# Patient Record
Sex: MALE | Race: BLACK OR AFRICAN AMERICAN | Employment: FULL TIME | URBAN - METROPOLITAN AREA
[De-identification: names, ages, dates, MRNs, and addresses within clinical notes are randomized per-mention and may not be internally consistent; named-entity substitution may affect disease eponyms.]

---

## 2018-12-13 ENCOUNTER — OFFICE VISIT (OUTPATIENT)
Dept: UROLOGY | Age: 63
End: 2018-12-13

## 2018-12-13 VITALS
BODY MASS INDEX: 31.08 KG/M2 | HEART RATE: 61 BPM | DIASTOLIC BLOOD PRESSURE: 80 MMHG | OXYGEN SATURATION: 96 % | HEIGHT: 71 IN | WEIGHT: 222 LBS | SYSTOLIC BLOOD PRESSURE: 110 MMHG

## 2018-12-13 DIAGNOSIS — R97.20 ELEVATED PSA: Primary | ICD-10-CM

## 2018-12-13 LAB
BILIRUB UR QL STRIP: NEGATIVE
GLUCOSE UR-MCNC: NEGATIVE MG/DL
KETONES P FAST UR STRIP-MCNC: NEGATIVE MG/DL
PH UR STRIP: 5.5 [PH] (ref 4.6–8)
PROT UR QL STRIP: NEGATIVE
SP GR UR STRIP: 1.02 (ref 1–1.03)
UA UROBILINOGEN AMB POC: NORMAL (ref 0.2–1)
URINALYSIS CLARITY POC: CLEAR
URINALYSIS COLOR POC: YELLOW
URINE BLOOD POC: NEGATIVE
URINE LEUKOCYTES POC: NEGATIVE
URINE NITRITES POC: NEGATIVE

## 2018-12-13 RX ORDER — CIPROFLOXACIN 500 MG/1
500 TABLET ORAL 2 TIMES DAILY
Qty: 20 TAB | Refills: 0 | Status: SHIPPED | OUTPATIENT
Start: 2018-12-13 | End: 2018-12-23

## 2018-12-13 RX ORDER — METOPROLOL SUCCINATE 50 MG/1
TABLET, EXTENDED RELEASE ORAL DAILY
COMMUNITY

## 2018-12-13 RX ORDER — LISINOPRIL 40 MG/1
40 TABLET ORAL DAILY
COMMUNITY

## 2018-12-13 RX ORDER — ATORVASTATIN CALCIUM 20 MG/1
TABLET, FILM COATED ORAL DAILY
COMMUNITY

## 2018-12-13 RX ORDER — FUROSEMIDE 20 MG/1
TABLET ORAL DAILY
COMMUNITY

## 2018-12-13 RX ORDER — ASPIRIN 81 MG/1
TABLET ORAL DAILY
COMMUNITY

## 2018-12-13 RX ORDER — AMLODIPINE BESYLATE 10 MG/1
TABLET ORAL DAILY
COMMUNITY

## 2018-12-13 NOTE — PROGRESS NOTES
Mr. Lanny Blevins has a reminder for a \"due or due soon\" health maintenance. I have asked that he contact his primary care provider for follow-up on this health maintenance.

## 2018-12-13 NOTE — PATIENT INSTRUCTIONS
Prostate Biopsy and Ultrasound: About This Test  What is it? A prostate biopsy is a type of test. Your doctor takes small tissue samples from your prostate gland. Then another doctor looks at the tissue under a microscope to see if there are cancer cells. This test is done by a doctor who specializes in men's genital and urinary problems (urologist). It can be done in your doctor's office, a day surgery clinic, or a hospital operating room. To get the tissue samples from the prostate, the doctor inserts a thin needle through the rectum, the urethra, or the area between the anus and scrotum (perineum). The most common method is through the rectum. Your doctor may use ultrasound to help guide the needle. Why is this test done? You may need a prostate biopsy if your doctor found something of concern during a digital rectal exam. Or you may need it if a blood test showed a high level of prostate-specific antigen (PSA). A biopsy can help find out if you have prostate cancer. How can you prepare for this test?  Before you have a prostate biopsy, tell your doctor if:  · You are taking any medicines or using any herbal supplements. · You are allergic to any medicines. · You have had bleeding problems, or you take aspirin or some other blood thinner. What happens before the test?  · You may need to have an enema before the test.  · You will need to take off all or most of your clothes. You will be given a cloth or paper gown to use during the test.  · You may be given a sedative through a vein (IV) in your arm. The sedative will help you relax and stay still. What happens during the test?  Through the rectum  · You may be asked to kneel, lie on your side, or lie on your back. · Your doctor may inject an anesthetic around the prostate to numb the area before the sample is taken. · Ultrasound is often used to guide the needle to the correct spot. · Your doctor may choose to use a needle guide for the biopsy. He or she will attach the guide to a finger. Your doctor will insert the finger into your rectum. · The needle will enter the prostate and take 6 to 12 samples. Through the urethra  · You will lie on your back. Your feet will rest in stirrups. · You will get anesthesia. The anesthesia may make you sleep. Or it may just numb the area being worked on.  · Your doctor will insert a lighted scope (cystoscope) into your urethra. The scope allows your doctor to look directly at the prostate. Your doctor will pass a cutting loop through the scope to remove samples of prostate tissue. Through the perineum  · You will lie on an exam table either on your side or on your back with your knees bent. You will get anesthesia. The anesthesia may make you sleep. Or it may just numb the area being worked on.  · Your doctor will make a small cut in your perineum. Then he or she will insert a finger into the rectum to hold the prostate. · Your doctor will then insert the needle through the cut and into the prostate. · The needle collects samples of tissue and is then pulled out. What else should you know about this test?  · A prostate biopsy has a slight risk of causing problems such as infection or bleeding. · If the biopsy went through your rectum, you may have a small amount of bleeding from your rectum for 2 to 3 days after the biopsy. · You may have a little pain in your pelvic area. You may also have a little blood in your urine for 1 to 5 days. · You may have some blood in your semen for a week or longer. How long will the test take? · This test will take about 15 to 45 minutes. What happens after the test?  Your doctor will tell you what to expect and watch for after your test. In general:  · If you have anesthesia that makes you sleep, you will be in a recovery room for a few hours. You will need someone to drive you home. You may feel tired for the rest of the day.   · You will probably be able to go home right away.  · If your doctor had you stop taking any medicine for the biopsy, ask him or her when you can start taking it again. If you were given antibiotics, take them as directed. · Do not do heavy work or exercise for 4 hours after the test.  · Your doctor will tell you how long it may take to get your results back. Follow-up care is a key part of your treatment and safety. Be sure to make and go to all appointments, and call your doctor if you are having problems. It's also a good idea to keep a list of the medicines you take. Ask your doctor when you can expect to have your test results. Where can you learn more? Go to http://nataly-rafael.info/. Enter Y504 in the search box to learn more about \"Prostate Biopsy and Ultrasound: About This Test.\"  Current as of: March 28, 2018  Content Version: 11.8  © 6112-1135 [employer]. Care instructions adapted under license by [employer] (which disclaims liability or warranty for this information). If you have questions about a medical condition or this instruction, always ask your healthcare professional. [address] any warranty or liability for your use of this information.

## 2018-12-13 NOTE — PROGRESS NOTES
Abdullahi Botello 59 y.o. male     Mr. Paula Nazario seen today for evaluation of elevated PSA found on routine testing-PSA 8.6    Patient has no irritative or obstructive voiding symptoms  No family history of prostate malignancy      PSA 8.6 on 27 September 2018    Hospitalized at [facility] in [facility] for sick sinus syndrome-pacemaker implanted/on no anticoagulation therapy      Review of Systems:   CNS: No seizures syncope headaches or visual changes  Respiratory: No wheezing shortness of breath chest pain or coughing  Cardiovascular: Sick sinus syndrome/pacemaker  Intestinal: No dyspepsia diarrhea or constipation  Urinary: Urinary urgency and frequency  Skeletal: Large joint arthritis  Endocrine no diabetes or thyroid disease  Other:    Allergies: No Known Allergies   Medications:    Current Outpatient Medications   Medication Sig Dispense Refill    lisinopril (PRINIVIL, ZESTRIL) 40 mg tablet Take 40 mg by mouth daily.  metoprolol succinate (TOPROL-XL) 50 mg XL tablet Take  by mouth daily.  furosemide (LASIX) 20 mg tablet Take  by mouth daily.  amLODIPine (NORVASC) 10 mg tablet Take  by mouth daily.  aspirin delayed-release 81 mg tablet Take  by mouth daily.  atorvastatin (LIPITOR) 20 mg tablet Take  by mouth daily.  ciprofloxacin HCl (CIPRO) 500 mg tablet Take 1 Tab by mouth two (2) times a day for 10 days. 20 Tab 0       Past Medical History:   Diagnosis Date    Acute kidney injury ([address]     Back problem     Chest pain     Dizzinesses     Heart abnormality     Hypercholesterolemia     Hypertension     Joint swelling     Muscle ache     Muscle pain     Sleep trouble     Stiff joint     Swallowing difficulty       History reviewed. No pertinent surgical history.   Social History     Socioeconomic History    Marital status:      Spouse name: Not on file    Number of children: Not on file    Years of education: Not on file    Highest education level: Not on file   Social Needs    Financial resource strain: Not on file    Food insecurity - worry: Not on file    Food insecurity - inability: Not on file    Transportation needs - medical: Not on file   [employer] needs - non-medical: Not on file   Occupational History    Not on file   Tobacco Use    Smoking status: Current Every Day Smoker    Smokeless tobacco: Never Used   Substance and Sexual Activity    Alcohol use: Yes    Drug use: No    Sexual activity: Not Currently   Other Topics Concern    Not on file   Social History Narrative    Not on file      History reviewed. No pertinent family history.      Physical Examination: Well-nourished mature male in no apparent distress                           left subclavicular pacemaker implant    Abdomen is nontender no palpable masses no organomegaly  Back-no percussion CVA tenderness on either side  No inguinal hernia or adenopathy  Penis is normal  Testes are normal in size shape and consistency bilaterally-no epididymal induration or tenderness on either side  Spermatic cords are palpably normal bilaterally  Scrotum small fluctuant nontender right hydrocele  Prostate by KAREN is large, rounded, smooth, benign in consistency and nontender-no induration no nodularity  No rectal masses tenderness or induration      Urinalysis: Negative dipstick/nitrite negative/heme-negative    Impression: Elevated PSA normal prostate by KAREN      Plan: Prostate biopsy is indicated and recommended    Counseled today regarding technique of transperineal prostate biopsy including risk of bleeding, infection, urinary retention  Rx Cipro 500 mg twice daily times 3 days prep prophylaxis/attendant required    RTC 3 weeks for prostate biopsy-accompanied by an attendant who will serve as designated  after prostate biopsy      MD Laura Gonzales MD  -electronically signed-    PLEASE NOTE:  This document has been produced using voice recognition software. Unrecognized errors in transcription may be present.

## 2019-01-31 ENCOUNTER — OFFICE VISIT (OUTPATIENT)
Dept: UROLOGY | Age: 64
End: 2019-01-31

## 2019-01-31 VITALS
HEART RATE: 66 BPM | WEIGHT: 224 LBS | BODY MASS INDEX: 31.36 KG/M2 | SYSTOLIC BLOOD PRESSURE: 124 MMHG | OXYGEN SATURATION: 99 % | HEIGHT: 71 IN | DIASTOLIC BLOOD PRESSURE: 83 MMHG

## 2019-01-31 DIAGNOSIS — R97.20 ELEVATED PSA: Primary | ICD-10-CM

## 2019-01-31 DIAGNOSIS — R77.2 ELEVATED AFP: ICD-10-CM

## 2019-01-31 LAB
BILIRUB UR QL STRIP: NEGATIVE
GLUCOSE UR-MCNC: NEGATIVE MG/DL
KETONES P FAST UR STRIP-MCNC: NEGATIVE MG/DL
PH UR STRIP: 5 [PH] (ref 4.6–8)
PROT UR QL STRIP: NEGATIVE
SP GR UR STRIP: 1.01 (ref 1–1.03)
UA UROBILINOGEN AMB POC: NORMAL (ref 0.2–1)
URINALYSIS CLARITY POC: CLEAR
URINALYSIS COLOR POC: YELLOW
URINE BLOOD POC: NEGATIVE
URINE LEUKOCYTES POC: NEGATIVE
URINE NITRITES POC: NEGATIVE

## 2019-01-31 RX ORDER — SULFAMETHOXAZOLE AND TRIMETHOPRIM 800; 160 MG/1; MG/1
TABLET ORAL
COMMUNITY
Start: 2019-01-23 | End: 2019-02-12

## 2019-01-31 RX ORDER — CIPROFLOXACIN 500 MG/1
TABLET ORAL 2 TIMES DAILY
COMMUNITY
End: 2019-02-12

## 2019-01-31 NOTE — PROGRESS NOTES
Ceasar Shelby 59 y.o. male Mr. Richy Sanches seen today for prostate biopsy assessing elevated PSA Prostate biopsy attempt in the office unsuccessful because of intolerance for needle sticks in the perineum and prostate region Prostate biopsy will require general anesthesia 
elevated PSA found on routine testing-PSA 8.6 
  
Patient has no irritative or obstructive voiding symptoms No family history of prostate malignancy 
  
  
PSA 8.6 on 27 September 2018 
  
Hospitalized at [facility] in [facility] for sick sinus syndrome-pacemaker implanted/on no anticoagulation therapy 
  
  
Review of Systems:  
CNS: No seizures syncope headaches or visual changes Respiratory: No wheezing shortness of breath chest pain or coughing Cardiovascular: Sick sinus syndrome/pacemaker Intestinal: No dyspepsia diarrhea or constipation Urinary: Urinary urgency and frequency Skeletal: Large joint arthritis Endocrine no diabetes or thyroid disease Other: 
  
 
TRANSRECTAL ULTRASOUND IMAGING OF THE PROSTATE: 
 
Multiple transverse and longitudinal images of the p rostate, seminal vesicles, and bladder revealed: Symmetrical outlines bilaterally with central cystic density measuring 7 mm-no stone densities evident no hypodense segments evident on either lobe Prostate Dimensions: 3.8 cm x 3.0 cm x 4.2 cm Prostate Volume = 0.5(H)(L)(W) =   23.9       cc  
 
PSA Density: (PSA)/Prostate Volume =   8.6/23.9   =  PSAD  =    0.36 PROSTATE BIOPSY REPORT Patient ID confirmed prior to procedure: Yes Indications for prostate biopsy: PSA 8.6 Pre biopsy preparations:         ___x_____ Cipro 500 mg PO Biopsy Procedure Local anesthesia of the perineum was obtained by injecting 1% Lidocaine into the skin of the perineum 1 cm anterior to the anal verge and then into the deep perineum in the direction of the prostate.  Guided by a gloved finger in the rectum, a 14 gauge needle was then passed into the anesthetized region and advanced in the direction of the prostate. An 18 gauge spinal needle was then passed through the 14 gauge needle and 10 cc's of 1% Lidocaine was infiltrated periprostatically with needle placement guided by ultrasound imaging obtained by transrectal positioning of the ultrasound transducer. An 18 gauge Biopty needle was then passed through the 14 gauge conduit needle multiple times obtaining  0   cores of tissue from the left lobe and   0   cores of tissue from the right lobe, biopsy needle placement guided by real time transrectal ultrasound imaging of the needle along its path through the perineum into the prostate. The conduit needle was then removed and pressure was applied to the perineum for one minute. The patient was then given oral and written instructions regarding post biopsy precautions as well as instructions regarding activity, medication, and follow up. Comments: Transperineal prostate biopsy procedure was aborted because of the patient's intolerance of needle sticks and spasm of buttock muscles preventing access to the perineum for passage of biopsy needle EBL: Minimal none  
specimen removed: Allergies: No Known Allergies Medications:   
Current Outpatient Medications Medication Sig Dispense Refill  trimethoprim-sulfamethoxazole (BACTRIM DS, SEPTRA DS) 160-800 mg per tablet  ciprofloxacin HCl (CIPRO) 500 mg tablet Take  by mouth two (2) times a day.  pitavastatin calcium (LIVALO) 2 mg tablet Take  by mouth daily.  lisinopril (PRINIVIL, ZESTRIL) 40 mg tablet Take 40 mg by mouth daily.  metoprolol succinate (TOPROL-XL) 50 mg XL tablet Take  by mouth daily.  furosemide (LASIX) 20 mg tablet Take  by mouth daily.  amLODIPine (NORVASC) 10 mg tablet Take  by mouth daily.  aspirin delayed-release 81 mg tablet Take  by mouth daily.  atorvastatin (LIPITOR) 20 mg tablet Take  by mouth daily. Past Medical History: Diagnosis Date  Acute kidney injury ([facility] [address]  Back problem  Chest pain  Dizzinesses  Heart abnormality  Hypercholesterolemia  Hypertension  Joint swelling  Muscle ache  Muscle pain  Sleep trouble  Stiff joint  Swallowing difficulty History reviewed. No pertinent surgical history. Social History Socioeconomic History  Marital status:  Spouse name: Not on file  Number of children: Not on file  Years of education: Not on file  Highest education level: Not on file Social Needs  Financial resource strain: Not on file  Food insecurity - worry: Not on file  Food insecurity - inability: Not on file  Transportation needs - medical: Not on file  Transportation needs - non-medical: Not on file Occupational History  Not on file Tobacco Use  Smoking status: Current Every Day Smoker  Smokeless tobacco: Never Used Substance and Sexual Activity  Alcohol use: Yes  Drug use: No  
 Sexual activity: Not Currently Other Topics Concern  Not on file Social History Narrative  Not on file History reviewed. No pertinent family history. Physical Examination: Well-nourished mature male in no apparent distress Neck: No masses nodes or bruits Lungs: No wheezes rales or rhonchi Heart: Regular sinus rhythm no murmurs no gallops no rubs Abdomen: Nontender no palpable masses no organomegaly no bruits Back: No percussion CVA tenderness Skin: Warm and dry no rash no lesions Neurologic: No motor or sensory deficits in arms or legs Genitalia: Penis is normal, testes are normal bilaterally, prostate by KAREN is normal in size shape and consistency-nontender no nodularity No rectal masses induration or tenderness Urinalysis: Negative dipstick/nitrite negative/heme-negative Impression: Elevated PSA with failed attempt at prostate biopsy in office Plan: Transperineal prostate biopsy under general anesthesia at [facility] 
 
Counseling Note: 
Technique of transperineal prostate biopsy have been described discussed along with possible complications of bleeding, infection, and urinary retention RTC 1 week after biopsy for pathology report More than 1/2 of this 25 minute visit was spent in counselling and coordination of care, as described above. Sonia Krueger MD 
-electronically signed- 
 
PLEASE NOTE: 
This document has been produced using voice recognition software. Unrecognized errors in transcription may be present.

## 2019-01-31 NOTE — PROGRESS NOTES
Mr. Shon Latham has a reminder for a \"due or due soon\" health maintenance. I have asked that he contact his primary care provider for follow-up on this health maintenance. [facility] PROCEDURE PROGRESS NOTE Chart reviewed for the following: 
 Gaby Edgar LPN, have reviewed the History, Physical and updated the Allergic reactions for [facility] TIME OUT performed immediately prior to start of procedure: 
 I, Ziyad Diaz LPN, have performed the following reviews on [facility] prior to the start of the procedure: 
         
* Patient was identified by name and date of birth * Agreement on procedure being performed was verified * Risks and Benefits explained to the patient * Procedure site verified and marked as necessary * Patient was positioned for comfort * Consent was signed and verified Time: 13:50 Date of procedure: 1/31/2019 Procedure performed by:  Lian Chappell MD 
 
Provider assisted by: Madison Fonseca LPN Patient assisted by: self How tolerated by patient: tolerated the procedure well with no complications Post Procedural Pain Scale: 0 - No Hurt Comments: Patient verbalized understanding of procedure and post procedure instructions.

## 2019-02-06 RX ORDER — SODIUM CHLORIDE 9 MG/ML
100 INJECTION, SOLUTION INTRAVENOUS CONTINUOUS
Status: CANCELLED | OUTPATIENT
Start: 2019-02-06

## 2019-02-07 LAB
ANION GAP SERPL CALC-SCNC: 12.8 MMOL/L
BUN SERPL-MCNC: 21 MG/DL (ref 6–22)
CALCIUM SERPL-MCNC: 9.3 MG/DL (ref 8.4–10.4)
CHLORIDE SERPL-SCNC: 102 MMOL/L (ref 98–110)
CO2 SERPL-SCNC: 28 MMOL/L (ref 20–32)
CREAT SERPL-MCNC: 2.1 MG/DL (ref 0.8–1.6)
ERYTHROCYTE [DISTWIDTH] IN BLOOD BY AUTOMATED COUNT: 14.3 % (ref 10–15.5)
GFRAA, 66117: 38.8
GFRNA, 66118: 32
GLUCOSE SERPL-MCNC: 92 MG/DL (ref 70–99)
HCT VFR BLD AUTO: 38.6 % (ref 39.3–51.6)
HGB BLD-MCNC: 12.3 G/DL (ref 13.1–17.2)
MCH RBC QN AUTO: 26 PG (ref 26–34)
MCHC RBC AUTO-ENTMCNC: 32 G/DL (ref 31–36)
MCV RBC AUTO: 82 FL (ref 80–95)
PLATELET # BLD AUTO: 161 K/UL (ref 140–440)
PMV BLD AUTO: 12.1 FL (ref 9–13)
POTASSIUM SERPL-SCNC: 4 MMOL/L (ref 3.5–5.5)
RBC # BLD AUTO: 4.73 M/UL (ref 3.8–5.8)
SODIUM SERPL-SCNC: 143 MMOL/L (ref 133–145)
WBC # BLD AUTO: 6.3 K/UL (ref 4–11)

## 2019-02-14 ENCOUNTER — ANESTHESIA EVENT (OUTPATIENT)
Dept: SURGERY | Age: 64
End: 2019-02-14
Payer: COMMERCIAL

## 2019-02-15 ENCOUNTER — ANESTHESIA (OUTPATIENT)
Dept: SURGERY | Age: 64
End: 2019-02-15
Payer: COMMERCIAL

## 2019-02-15 ENCOUNTER — HOSPITAL ENCOUNTER (OUTPATIENT)
Age: 64
Setting detail: OUTPATIENT SURGERY
Discharge: HOME | End: 2019-02-15
Attending: UROLOGY | Admitting: UROLOGY
Payer: COMMERCIAL

## 2019-02-15 VITALS
BODY MASS INDEX: 31.81 KG/M2 | RESPIRATION RATE: 13 BRPM | HEIGHT: 71 IN | OXYGEN SATURATION: 94 % | DIASTOLIC BLOOD PRESSURE: 84 MMHG | TEMPERATURE: 97 F | SYSTOLIC BLOOD PRESSURE: 131 MMHG | WEIGHT: 227.25 LBS | HEART RATE: 60 BPM

## 2019-02-15 DIAGNOSIS — R97.20 ELEVATED PSA: Primary | ICD-10-CM

## 2019-02-15 PROCEDURE — 77030008683 HC TU ET CUF COVD -A: Performed by: NURSE ANESTHETIST, CERTIFIED REGISTERED

## 2019-02-15 PROCEDURE — 76210000016 HC OR PH I REC 1 TO 1.5 HR: Performed by: UROLOGY

## 2019-02-15 PROCEDURE — 76210000022 HC REC RM PH II 1.5 TO 2 HR: Performed by: UROLOGY

## 2019-02-15 PROCEDURE — 77030020782 HC GWN BAIR PAWS FLX 3M -B: Performed by: UROLOGY

## 2019-02-15 PROCEDURE — 74011000250 HC RX REV CODE- 250: Performed by: NURSE ANESTHETIST, CERTIFIED REGISTERED

## 2019-02-15 PROCEDURE — 77030034696 HC CATH URETH FOL 2W BARD -A: Performed by: UROLOGY

## 2019-02-15 PROCEDURE — 77030012863 HC BG URIN LEG HOLL -A: Performed by: UROLOGY

## 2019-02-15 PROCEDURE — 77030003481 HC NDL BIOP GUN BARD -B: Performed by: UROLOGY

## 2019-02-15 PROCEDURE — G0416 PROSTATE BIOPSY, ANY MTHD: HCPCS

## 2019-02-15 PROCEDURE — 77030018836 HC SOL IRR NACL ICUM -A: Performed by: UROLOGY

## 2019-02-15 PROCEDURE — 74011250636 HC RX REV CODE- 250/636

## 2019-02-15 PROCEDURE — 74011250636 HC RX REV CODE- 250/636: Performed by: NURSE ANESTHETIST, CERTIFIED REGISTERED

## 2019-02-15 PROCEDURE — 74011250636 HC RX REV CODE- 250/636: Performed by: UROLOGY

## 2019-02-15 PROCEDURE — 76060000032 HC ANESTHESIA 0.5 TO 1 HR: Performed by: UROLOGY

## 2019-02-15 PROCEDURE — 74011000250 HC RX REV CODE- 250

## 2019-02-15 PROCEDURE — 77030032490 HC SLV COMPR SCD KNE COVD -B: Performed by: UROLOGY

## 2019-02-15 PROCEDURE — 76010000160 HC OR TIME 0.5 TO 1 HR INTENSV-TIER 1: Performed by: UROLOGY

## 2019-02-15 PROCEDURE — 77030005206: Performed by: UROLOGY

## 2019-02-15 RX ORDER — DIPHENHYDRAMINE HYDROCHLORIDE 50 MG/ML
12.5 INJECTION, SOLUTION INTRAMUSCULAR; INTRAVENOUS
Status: DISCONTINUED | OUTPATIENT
Start: 2019-02-15 | End: 2019-02-15 | Stop reason: HOSPADM

## 2019-02-15 RX ORDER — SODIUM CHLORIDE, SODIUM LACTATE, POTASSIUM CHLORIDE, CALCIUM CHLORIDE 600; 310; 30; 20 MG/100ML; MG/100ML; MG/100ML; MG/100ML
50 INJECTION, SOLUTION INTRAVENOUS CONTINUOUS
Status: DISCONTINUED | OUTPATIENT
Start: 2019-02-15 | End: 2019-02-15 | Stop reason: HOSPADM

## 2019-02-15 RX ORDER — ROCURONIUM BROMIDE 10 MG/ML
INJECTION, SOLUTION INTRAVENOUS AS NEEDED
Status: DISCONTINUED | OUTPATIENT
Start: 2019-02-15 | End: 2019-02-15 | Stop reason: HOSPADM

## 2019-02-15 RX ORDER — SODIUM CHLORIDE, SODIUM LACTATE, POTASSIUM CHLORIDE, CALCIUM CHLORIDE 600; 310; 30; 20 MG/100ML; MG/100ML; MG/100ML; MG/100ML
75 INJECTION, SOLUTION INTRAVENOUS CONTINUOUS
Status: DISCONTINUED | OUTPATIENT
Start: 2019-02-15 | End: 2019-02-15 | Stop reason: HOSPADM

## 2019-02-15 RX ORDER — ALBUTEROL SULFATE 0.83 MG/ML
2.5 SOLUTION RESPIRATORY (INHALATION) AS NEEDED
Status: DISCONTINUED | OUTPATIENT
Start: 2019-02-15 | End: 2019-02-15 | Stop reason: HOSPADM

## 2019-02-15 RX ORDER — CEFAZOLIN SODIUM 2 G/50ML
2 SOLUTION INTRAVENOUS ONCE
Status: COMPLETED | OUTPATIENT
Start: 2019-02-15 | End: 2019-02-15

## 2019-02-15 RX ORDER — SUCCINYLCHOLINE CHLORIDE 20 MG/ML
INJECTION INTRAMUSCULAR; INTRAVENOUS AS NEEDED
Status: DISCONTINUED | OUTPATIENT
Start: 2019-02-15 | End: 2019-02-15 | Stop reason: HOSPADM

## 2019-02-15 RX ORDER — HYDROCODONE BITARTRATE AND ACETAMINOPHEN 5; 300 MG/1; MG/1
1 TABLET ORAL
Qty: 12 TAB | Refills: 0 | Status: SHIPPED | OUTPATIENT
Start: 2019-02-15 | End: 2019-02-17

## 2019-02-15 RX ORDER — DEXAMETHASONE SODIUM PHOSPHATE 4 MG/ML
INJECTION, SOLUTION INTRA-ARTICULAR; INTRALESIONAL; INTRAMUSCULAR; INTRAVENOUS; SOFT TISSUE AS NEEDED
Status: DISCONTINUED | OUTPATIENT
Start: 2019-02-15 | End: 2019-02-15 | Stop reason: HOSPADM

## 2019-02-15 RX ORDER — PROPOFOL 10 MG/ML
INJECTION, EMULSION INTRAVENOUS AS NEEDED
Status: DISCONTINUED | OUTPATIENT
Start: 2019-02-15 | End: 2019-02-15 | Stop reason: HOSPADM

## 2019-02-15 RX ORDER — HYDROMORPHONE HYDROCHLORIDE 2 MG/ML
0.5 INJECTION, SOLUTION INTRAMUSCULAR; INTRAVENOUS; SUBCUTANEOUS
Status: DISCONTINUED | OUTPATIENT
Start: 2019-02-15 | End: 2019-02-15 | Stop reason: HOSPADM

## 2019-02-15 RX ORDER — NALOXONE HYDROCHLORIDE 0.4 MG/ML
0.04 INJECTION, SOLUTION INTRAMUSCULAR; INTRAVENOUS; SUBCUTANEOUS AS NEEDED
Status: DISCONTINUED | OUTPATIENT
Start: 2019-02-15 | End: 2019-02-15 | Stop reason: HOSPADM

## 2019-02-15 RX ORDER — GLYCOPYRROLATE 0.2 MG/ML
INJECTION INTRAMUSCULAR; INTRAVENOUS AS NEEDED
Status: DISCONTINUED | OUTPATIENT
Start: 2019-02-15 | End: 2019-02-15 | Stop reason: HOSPADM

## 2019-02-15 RX ORDER — ONDANSETRON 2 MG/ML
INJECTION INTRAMUSCULAR; INTRAVENOUS AS NEEDED
Status: DISCONTINUED | OUTPATIENT
Start: 2019-02-15 | End: 2019-02-15 | Stop reason: HOSPADM

## 2019-02-15 RX ORDER — SODIUM CHLORIDE 0.9 % (FLUSH) 0.9 %
5-40 SYRINGE (ML) INJECTION AS NEEDED
Status: DISCONTINUED | OUTPATIENT
Start: 2019-02-15 | End: 2019-02-15 | Stop reason: HOSPADM

## 2019-02-15 RX ORDER — NEOSTIGMINE METHYLSULFATE 1 MG/ML
INJECTION INTRAVENOUS AS NEEDED
Status: DISCONTINUED | OUTPATIENT
Start: 2019-02-15 | End: 2019-02-15 | Stop reason: HOSPADM

## 2019-02-15 RX ORDER — SULFAMETHOXAZOLE AND TRIMETHOPRIM 800; 160 MG/1; MG/1
1 TABLET ORAL 2 TIMES DAILY
Qty: 10 TAB | Refills: 0 | Status: SHIPPED | OUTPATIENT
Start: 2019-02-15 | End: 2019-02-20

## 2019-02-15 RX ORDER — PROPOFOL 10 MG/ML
INJECTION, EMULSION INTRAVENOUS AS NEEDED
Status: DISCONTINUED | OUTPATIENT
Start: 2019-02-15 | End: 2019-02-15

## 2019-02-15 RX ORDER — FENTANYL CITRATE 50 UG/ML
INJECTION, SOLUTION INTRAMUSCULAR; INTRAVENOUS AS NEEDED
Status: DISCONTINUED | OUTPATIENT
Start: 2019-02-15 | End: 2019-02-15 | Stop reason: HOSPADM

## 2019-02-15 RX ORDER — SODIUM CHLORIDE 9 MG/ML
100 INJECTION, SOLUTION INTRAVENOUS CONTINUOUS
Status: DISCONTINUED | OUTPATIENT
Start: 2019-02-15 | End: 2019-02-15 | Stop reason: HOSPADM

## 2019-02-15 RX ORDER — DOCUSATE SODIUM 100 MG/1
100 CAPSULE, LIQUID FILLED ORAL 2 TIMES DAILY
Qty: 10 CAP | Refills: 2 | Status: SHIPPED | OUTPATIENT
Start: 2019-02-15 | End: 2019-02-20

## 2019-02-15 RX ORDER — ONDANSETRON 2 MG/ML
4 INJECTION INTRAMUSCULAR; INTRAVENOUS ONCE
Status: DISCONTINUED | OUTPATIENT
Start: 2019-02-15 | End: 2019-02-15 | Stop reason: HOSPADM

## 2019-02-15 RX ORDER — MIDAZOLAM HYDROCHLORIDE 1 MG/ML
INJECTION, SOLUTION INTRAMUSCULAR; INTRAVENOUS AS NEEDED
Status: DISCONTINUED | OUTPATIENT
Start: 2019-02-15 | End: 2019-02-15 | Stop reason: HOSPADM

## 2019-02-15 RX ORDER — LIDOCAINE HYDROCHLORIDE 20 MG/ML
INJECTION, SOLUTION EPIDURAL; INFILTRATION; INTRACAUDAL; PERINEURAL AS NEEDED
Status: DISCONTINUED | OUTPATIENT
Start: 2019-02-15 | End: 2019-02-15 | Stop reason: HOSPADM

## 2019-02-15 RX ORDER — SODIUM CHLORIDE 0.9 % (FLUSH) 0.9 %
5-40 SYRINGE (ML) INJECTION EVERY 8 HOURS
Status: DISCONTINUED | OUTPATIENT
Start: 2019-02-15 | End: 2019-02-15 | Stop reason: HOSPADM

## 2019-02-15 RX ADMIN — GLYCOPYRROLATE 0.6 MG: 0.2 INJECTION INTRAMUSCULAR; INTRAVENOUS at 11:26

## 2019-02-15 RX ADMIN — ROCURONIUM BROMIDE 15 MG: 10 INJECTION, SOLUTION INTRAVENOUS at 10:56

## 2019-02-15 RX ADMIN — GLYCOPYRROLATE 0.2 MG: 0.2 INJECTION INTRAMUSCULAR; INTRAVENOUS at 10:44

## 2019-02-15 RX ADMIN — LIDOCAINE HYDROCHLORIDE 100 MG: 20 INJECTION, SOLUTION EPIDURAL; INFILTRATION; INTRACAUDAL; PERINEURAL at 10:51

## 2019-02-15 RX ADMIN — FENTANYL CITRATE 50 MCG: 50 INJECTION, SOLUTION INTRAMUSCULAR; INTRAVENOUS at 11:34

## 2019-02-15 RX ADMIN — FAMOTIDINE 20 MG: 10 INJECTION, SOLUTION INTRAVENOUS at 09:03

## 2019-02-15 RX ADMIN — FENTANYL CITRATE 50 MCG: 50 INJECTION, SOLUTION INTRAMUSCULAR; INTRAVENOUS at 11:01

## 2019-02-15 RX ADMIN — ONDANSETRON 4 MG: 2 INJECTION INTRAMUSCULAR; INTRAVENOUS at 10:44

## 2019-02-15 RX ADMIN — PROPOFOL 200 MG: 10 INJECTION, EMULSION INTRAVENOUS at 10:51

## 2019-02-15 RX ADMIN — DEXAMETHASONE SODIUM PHOSPHATE 4 MG: 4 INJECTION, SOLUTION INTRA-ARTICULAR; INTRALESIONAL; INTRAMUSCULAR; INTRAVENOUS; SOFT TISSUE at 10:51

## 2019-02-15 RX ADMIN — SUCCINYLCHOLINE CHLORIDE 160 MG: 20 INJECTION INTRAMUSCULAR; INTRAVENOUS at 10:56

## 2019-02-15 RX ADMIN — ROCURONIUM BROMIDE 5 MG: 10 INJECTION, SOLUTION INTRAVENOUS at 10:51

## 2019-02-15 RX ADMIN — NEOSTIGMINE METHYLSULFATE 5 MG: 1 INJECTION INTRAVENOUS at 11:26

## 2019-02-15 RX ADMIN — ONDANSETRON 4 MG: 2 INJECTION INTRAMUSCULAR; INTRAVENOUS at 11:26

## 2019-02-15 RX ADMIN — CEFAZOLIN SODIUM 2000 MG: 2 SOLUTION INTRAVENOUS at 10:44

## 2019-02-15 RX ADMIN — SODIUM CHLORIDE, SODIUM LACTATE, POTASSIUM CHLORIDE, AND CALCIUM CHLORIDE 75 ML/HR: 600; 310; 30; 20 INJECTION, SOLUTION INTRAVENOUS at 09:03

## 2019-02-15 RX ADMIN — MIDAZOLAM HYDROCHLORIDE 2 MG: 1 INJECTION, SOLUTION INTRAMUSCULAR; INTRAVENOUS at 10:44

## 2019-02-15 RX ADMIN — FENTANYL CITRATE 150 MCG: 50 INJECTION, SOLUTION INTRAMUSCULAR; INTRAVENOUS at 10:51

## 2019-02-15 RX ADMIN — FENTANYL CITRATE 50 MCG: 50 INJECTION, SOLUTION INTRAMUSCULAR; INTRAVENOUS at 11:11

## 2019-02-15 RX ADMIN — SODIUM CHLORIDE, SODIUM LACTATE, POTASSIUM CHLORIDE, AND CALCIUM CHLORIDE: 600; 310; 30; 20 INJECTION, SOLUTION INTRAVENOUS at 10:44

## 2019-02-15 NOTE — BRIEF OP NOTE
BRIEF OPERATIVE NOTE Date of Procedure: 2/15/2019 Preoperative Diagnosis: R97.20 ELEVATED PSA           PSA 8.6 Postoperative Diagnosis: R97.20 ELEVATED PSA Procedure(s): 
CYSTOSCOPY WITH PROSTATE BIOPSY Surgeon(s) and Role: 
   Marian Vieira MD - Primary Surgical Assistant: none Surgical Staff: 
Circ-1: Zuri Hoff RN 
Circ-2: Eden Aragon RN Scrub Tech-1: Grisel Smith Event Time In Time Out Incision Start 1100 Incision Close 1113 Anesthesia: General  
Estimated Blood Loss:minimal; 
Specimens:  
ID Type Source Tests Collected by Time Destination 1 : Prostate Right [place] Preservative Prostate  Marian Vieira MD 2/15/2019 1112 Pathology 2 : Prostate Right Mid Preservative Prostate  Marian Vieira MD 2/15/2019 1110 Pathology 3 : Prostate Right Base Preservative Prostate  Marian Vieira MD 2/15/2019 1111 Pathology 4 : Prostate Left [place] Preservative Prostate  Marian Vieira MD 2/15/2019 1103 Pathology 5 : Prostate Left Mid Preservative Prostate  Marian Vieira MD 2/15/2019 1107 Pathology 6 : Prostate Left Base Preservative Prostate  Marian Vieira MD 2/15/2019 1109 Pathology Findings: dimensions 5.39 cm x 2.52 cm x 4.31 cm      Prostate vol. 30.7 cc   PSAD 0.28 Complications: none Implants: none 24 core transperineal prostate biopsy 16 Fr Pike to leg bag post op Sharon Jackson MD

## 2019-02-15 NOTE — ANESTHESIA PREPROCEDURE EVALUATION
Anesthetic History No history of anesthetic complications Review of Systems / Medical History Patient summary reviewed and pertinent labs reviewed Pulmonary Sleep apnea Smoker Neuro/Psych Cardiovascular Hypertension Pacemaker and CAD 
 
 
  
GI/Hepatic/Renal 
  
GERD Endo/Other Morbid obesity Other Findings Physical Exam 
 
Airway Mallampati: III 
TM Distance: 4 - 6 cm Neck ROM: decreased range of motion Mouth opening: Diminished (comment) Cardiovascular Rhythm: regular Rate: normal 
 
 
 
 Dental 
 
Dentition: Poor dentition Pulmonary Breath sounds clear to auscultation Abdominal 
GI exam deferred Other Findings Anesthetic Plan ASA: 3 Anesthesia type: general 
 
 
 
 
Induction: Intravenous Anesthetic plan and risks discussed with: Patient

## 2019-02-15 NOTE — ANESTHESIA POSTPROCEDURE EVALUATION
Procedure(s): 
CYSTOSCOPY WITH PROSTATE BIOPSY. Anesthesia Post Evaluation Multimodal analgesia: multimodal analgesia used between 6 hours prior to anesthesia start to PACU discharge Patient location during evaluation: bedside Patient participation: complete - patient participated Level of consciousness: awake Pain management: adequate Airway patency: patent Anesthetic complications: no 
Cardiovascular status: stable Respiratory status: acceptable Hydration status: acceptable Post anesthesia nausea and vomiting:  controlled Visit Vitals /76 Pulse 60 Temp 36.7 °C (98 °F) Resp 15 Ht 5' 11\" (1.803 m) Wt 103.1 kg (227 lb 4 oz) SpO2 95% BMI 31.69 kg/m²

## 2019-02-15 NOTE — H&P
Chryl Lake 59 y.o. male  
  
Mr. Jay Hunt seen today for prostate biopsy assessing elevated PSA 
  
Prostate biopsy attempt in the office unsuccessful because of intolerance for needle sticks in the perineum and prostate region Prostate biopsy will require general anesthesia 
elevated PSA found on routine testing-PSA 8.6 
  
Patient has no irritative or obstructive voiding symptoms No family history of prostate malignancy 
  
  
PSA 8.6 on 2018 
  
Hospitalized at [facility] in [place] for sick sinus syndrome-pacemaker implanted/on no anticoagulation therapy 
  
  
Review of Systems:  
CNS: No seizures syncope headaches or visual changes Respiratory: No wheezing shortness of breath chest pain or coughing Cardiovascular: Sick sinus syndrome/pacemaker Intestinal: No dyspepsia diarrhea or constipation Urinary: Urinary urgency and frequency Skeletal: Large joint arthritis Endocrine no diabetes or thyroid disease Other: 
  
  
  
TRANSRECTAL ULTRASOUND IMAGING OF THE PROSTATE: 
  
Multiple transverse and longitudinal images of the p rostate, seminal vesicles, and bladder revealed: Symmetrical outlines bilaterally with central cystic density measuring 7 mm-no stone densities evident no hypodense segments evident on either lobe 
  
Prostate Dimensions: 3.8 cm x 3.0 cm x 4.2 cm 
  
Prostate Volume = 0.5(H)(L)(W) =   23.9       cc  
  
PSA Density: (PSA)/Prostate Volume =   8.6/23.9   =  PSAD  =    0.36 
  
  
  
PROSTATE BIOPSY REPORT Patient ID confirmed prior to procedure: Yes 
  Indications for prostate biopsy: PSA 8.6 Pre biopsy preparations:         ___x_____ Cipro 500 mg PO Biopsy Procedure Local anesthesia of the perineum was obtained by injecting 1% Lidocaine into the skin of the perineum 1 cm anterior to the anal verge and then into the deep perineum in the direction of the prostate.  Guided by a gloved finger in the rectum, a 14 gauge needle was then passed into the anesthetized region and advanced in the direction of the prostate. An 18 gauge spinal needle was then passed through the 14 gauge needle and 10 cc's of 1% Lidocaine was infiltrated periprostatically with needle placement guided by ultrasound imaging obtained by transrectal positioning of the ultrasound transducer.  
  
An 18 gauge Biopty needle was then passed through the 14 gauge conduit needle multiple times obtaining  0   cores of tissue from the left lobe and   0   cores of tissue from the right lobe, biopsy needle placement guided by real time transrectal ultrasound imaging of the needle along its path through the perineum into the prostate. The conduit needle was then removed and pressure was applied to the perineum for one minute. The patient was then given oral and written instructions regarding post biopsy precautions as well as instructions regarding activity, medication, and follow up. 
  
Comments: Transperineal prostate biopsy procedure was aborted because of the patient's intolerance of needle sticks and spasm of buttock muscles preventing access to the perineum for passage of biopsy needle 
  
EBL: Minimal none  
specimen removed:  
  
  
Allergies: No Known Allergies Medications:   
      
Current Outpatient Medications Medication Sig Dispense Refill  trimethoprim-sulfamethoxazole (BACTRIM DS, SEPTRA DS) 160-800 mg per tablet        
 ciprofloxacin HCl (CIPRO) 500 mg tablet Take  by mouth two (2) times a day.      
 pitavastatin calcium (LIVALO) 2 mg tablet Take  by mouth daily.      
 lisinopril (PRINIVIL, ZESTRIL) 40 mg tablet Take 40 mg by mouth daily.      
 metoprolol succinate (TOPROL-XL) 50 mg XL tablet Take  by mouth daily.      
 furosemide (LASIX) 20 mg tablet Take  by mouth daily.      
 amLODIPine (NORVASC) 10 mg tablet Take  by mouth daily.      
  aspirin delayed-release 81 mg tablet Take  by mouth daily.      
 atorvastatin (LIPITOR) 20 mg tablet Take  by mouth daily.      
  
  
Past Medical History:  
Diagnosis Date  Acute kidney injury ([address]    
 Back problem    
 Chest pain    
 Dizzinesses    
 Heart abnormality    
 Hypercholesterolemia    
 Hypertension    
 Joint swelling    
 Muscle ache    
 Muscle pain    
 Sleep trouble    
 Stiff joint    
 Swallowing difficulty    
  
History reviewed. No pertinent surgical history. Social History  
  
     
Socioeconomic History  Marital status:   
    Spouse name: Not on file  Number of children: Not on file  Years of education: Not on file  Highest education level: Not on file Social Needs  Financial resource strain: Not on file  Food insecurity - worry: Not on file  Food insecurity - inability: Not on file  Transportation needs - medical: Not on file  Transportation needs - non-medical: Not on file Occupational History  Not on file Tobacco Use  Smoking status: Current Every Day Smoker  Smokeless tobacco: Never Used Substance and Sexual Activity  Alcohol use: Yes  Drug use: No  
 Sexual activity: Not Currently Other Topics Concern  Not on file Social History Narrative  Not on file History reviewed. No pertinent family history.  
  
Physical Examination: Well-nourished mature male in no apparent distress Neck: No masses nodes or bruits Lungs: No wheezes rales or rhonchi Heart: Regular sinus rhythm no murmurs no gallops no rubs Abdomen: Nontender no palpable masses no organomegaly no bruits Back: No percussion CVA tenderness Skin: Warm and dry no rash no lesions Neurologic: No motor or sensory deficits in arms or legs Genitalia: Penis is normal, testes are normal bilaterally, prostate by KAREN is normal in size shape and consistency-nontender no nodularity No rectal masses induration or tenderness 
  
 Urinalysis: Negative dipstick/nitrite negative/heme-negative 
  
Impression: Elevated PSA with failed attempt at prostate biopsy in office 
  
  
  
Plan: Transperineal prostate biopsy under general anesthesia at [facility] 
  
Counseling Note: 
Technique of transperineal prostate biopsy have been described discussed along with possible complications of bleeding, infection, and urinary retention 
  
RTC 1 week after biopsy for pathology report 
  
 
 
Mary Hylton MD

## 2019-02-15 NOTE — DISCHARGE INSTRUCTIONS
Light activity  Reg diet  Pike to leg bag  Rx Colace, Bactrim, Vicodin    rtc 1 week      Chito Ricks MD      Prostate Biopsy: About This Test  What is it? A prostate biopsy is a type of test. Your doctor takes small tissue samples from your prostate gland. Then another doctor looks at the tissue under a microscope to see if there are cancer cells. This test is done by a doctor who specializes in men's genital and urinary problems (urologist). It can be done in your doctor's office, a day surgery clinic, or a hospital operating room. To get the tissue samples from the prostate, the doctor inserts a thin needle through the rectum, the urethra, or the area between the anus and scrotum (perineum). The most common method is through the rectum. Your doctor may use ultrasound to help guide the needle. Why is this test done? You may need a prostate biopsy if your doctor found something of concern during a digital rectal exam. Or you may need it if a blood test showed a high level of prostate-specific antigen (PSA). A biopsy can help find out if you have prostate cancer. How can you prepare for this test?  Before you have a prostate biopsy, tell your doctor if you are taking any medicines or using any herbal supplements, or if you are allergic to any medicines. And tell your doctor if you have had bleeding problems, or you take aspirin or some other blood thinner. What happens before the test?  · You may need to have an enema before the test.  · You will need to take off all or most of your clothes. You will be given a cloth or paper gown to use during the test.  · You may be given a sedative through a vein (IV) in your arm. The sedative will help you relax and stay still. What happens during the test?  Some men have an MRI of the prostate before their biopsy. This helps to find the areas in the prostate to take biopsy samples.  If you have an MRI, your doctor will use ultrasound and the MRI results to find the areas to biopsy. Through the rectum  · You may be asked to kneel, lie on your side, or lie on your back. · Your doctor may inject an anesthetic around the prostate to numb the area before the sample is taken. · Ultrasound is often used to guide the needle to the correct spot. · Your doctor may choose to use a needle guide for the biopsy. He or she will attach the guide to a finger. Your doctor will insert the finger into your rectum. · The needle will enter the prostate and take 6 to 12 samples. Through the urethra  · You will lie on your back. Your feet will rest in stirrups. · You will get anesthesia. The anesthesia may make you sleep. Or it may just numb the area being worked on.  · Your doctor will insert a lighted scope (cystoscope) into your urethra. The scope allows your doctor to look directly at the prostate. Your doctor will pass a cutting loop through the scope to remove samples of prostate tissue. Through the perineum  · You will lie on an exam table either on your side or on your back with your knees bent. You will get anesthesia. The anesthesia may make you sleep. Or it may just numb the area being worked on.  · Your doctor will make a small cut in your perineum. Then he or she will insert a finger into the rectum to hold the prostate. · Your doctor will then insert the needle through the cut and into the prostate. · The needle collects samples of tissue and is then pulled out. What else should you know about this test?  · A prostate biopsy has a slight risk of causing problems such as infection or bleeding. · If the biopsy went through your rectum, you may have a small amount of bleeding from your rectum for 2 to 3 days after the biopsy. · You may have a little pain in your pelvic area. You may also have a little blood in your urine for 1 to 5 days. · You may have some blood in your semen for a week or longer. How long will the test take?   This test will take about 15 to 45 minutes. What happens after the test?  Your doctor will tell you what to expect and watch for after your test. In general:  · If you have anesthesia that makes you sleep, you will be in a recovery room for a few hours. You will need someone to drive you home. You may feel tired for the rest of the day. · You will probably be able to go home right away. · If your doctor had you stop taking any medicine for the biopsy, ask him or her when you can start taking it again. If you were given antibiotics, take them as directed. · Do not do heavy work or exercise for 4 hours after the test.  · Your doctor will tell you how long it may take to get your results back. Follow-up care is a key part of your treatment and safety. Be sure to make and go to all appointments, and call your doctor if you are having problems. It's also a good idea to keep a list of the medicines you take. Ask your doctor when you can expect to have your test results. Where can you learn more? Go to http://nataly-rafael.info/. Enter Y504 in the search box to learn more about \"Prostate Biopsy: About This Test.\"  Current as of: March 27, 2018  Content Version: 11.9  © 5016-3019 [employer]. Care instructions adapted under license by [employer] (which disclaims liability or warranty for this information). If you have questions about a medical condition or this instruction, always ask your healthcare professional. [place] [address] any warranty or liability for your use of this information. Learning About Urinary Catheter Care to Prevent Infection  What is a urinary catheter? A urinary catheter is a flexible plastic tube used to drain urine from your bladder when you can't urinate on your own. The catheter allows urine to drain from the bladder into a bag. Two types of drainage bags may be used with a urinary catheter.   · A bedside bag is a large bag that you can hang on the side of your bed or on a chair. You can use it overnight or anytime you will be sitting or lying down for a long time. · A leg bag is a small bag that you can use during the day. It is usually attached to your thigh or calf and hidden under your clothes. Having a urinary catheter increases your risk of getting a urinary tract infection. Germs may get on the catheter and cause an infection in your bladder or kidneys. The longer you have a catheter, the more likely it is that you will get an infection. You can help prevent this problem with good hygiene and careful handling of your catheter and drainage bags. How can you help prevent infection? Take care to be clean  · Always wash your hands well before and after you handle your catheter. · Clean the skin around the catheter twice a day using soap and water. Dry with a clean towel afterward. You can shower with your catheter and drainage bag in place unless your doctor told you not to. · When you clean around the catheter, check the surrounding skin for signs of infection. Look for things like pus or irritated, swollen, red, or tender skin around the catheter. Be careful with your drainage bag  · Always keep the drainage bag below the level of your bladder. This will help keep urine from flowing back into your bladder. · Check often to see that urine is flowing through the catheter into the drainage bag. · Empty the drainage bag when it is half full. This will keep it from overflowing or backing up. · When you empty the drainage bag, do not let the tubing or drain spout touch anything. Be careful with your catheter  · Do not unhook the catheter from the drain tube. That could let germs get into the tube. · Make sure that the catheter tubing does not get twisted or kinked. · Do not tug or pull on the catheter. And make sure that the drainage bag does not drag or pull on the catheter. · Do not put powder or lotion on the skin around the catheter.   · Talk with your doctor about your options for sexual intercourse while wearing a catheter. How do you empty a urine drainage bag? If your doctor has asked you to keep a record, write down the amount of urine in the bag before you empty it. Wash your hands before and after you touch the bag. 1. Remove the drain spout from its sleeve at the bottom of the drainage bag.  2. Open the valve on the drain spout. Let the urine flow out into the toilet or a container. Be careful not to let the tubing or drain spout touch anything. 3. After you empty the bag, close the valve. Then put the drain spout back into its sleeve at the bottom of the collection bag. How do you add a bedside bag to a leg bag? Wash your hands before and after you handle the bags. 1. Empty the leg bag attached to the catheter. 2. Put a clean towel under the leg bag.  3. Use an alcohol wipe to clean the tip of the bedside bag. Then connect the bedside bag to the leg bag. How can you clean a bedside drainage bag? Many people clean their bedside bag in the morning if they switch to a leg bag. To clean a bedside drainage ba. Remove the bedside bag from the leg bag.  2. Fill the bag with 2 parts vinegar and 3 parts water. Let it stand for 20 minutes. 3. Empty the bag, and let it air dry. When should you call for help? Call your doctor now or seek immediate medical care if:  · You have symptoms of a urinary infection. These may include:  ? Pain or burning when you urinate. ? A frequent need to urinate without being able to pass much urine. ? Pain in the flank, which is just below the rib cage and above the waist on either side of the back. ? Blood in your urine. ? A fever. · Your urine smells bad. · You see large blood clots in your urine. · No urine or very little urine is flowing into the bag for 4 or more hours.   Watch closely for changes in your health, and be sure to contact your doctor if:  · The area around the catheter becomes irritated, swollen, red, or tender, or there is pus draining from it. · Urine is leaking from the place where the catheter enters your body. Follow-up care is a key part of your treatment and safety. Be sure to make and go to all appointments, and call your doctor if you are having problems. It's also a good idea to know your test results and keep a list of the medicines you take. Where can you learn more? Go to http://nataly-rafael.info/. Enter U010 in the search box to learn more about \"Learning About Urinary Catheter Care to Prevent Infection. \"  Current as of: March 20, 2018  Content Version: 11.9  © 2103-0349 [employer]. Care instructions adapted under license by [employer] (which disclaims liability or warranty for this information). If you have questions about a medical condition or this instruction, always ask your healthcare professional. [place] [address] any warranty or liability for your use of this information. Constipation: Care Instructions  Your Care Instructions    Constipation means that you have a hard time passing stools (bowel movements). People pass stools from 3 times a day to once every 3 days. What is normal for you may be different. Constipation may occur with pain in the rectum and cramping. The pain may get worse when you try to pass stools. Sometimes there are small amounts of bright red blood on toilet paper or the surface of stools. This is because of enlarged veins near the rectum (hemorrhoids). A few changes in your diet and lifestyle may help you avoid ongoing constipation. Your doctor may also prescribe medicine to help loosen your stool. Some medicines can cause constipation. These include pain medicines and antidepressants. Tell your doctor about all the medicines you take. Your doctor may want to make a medicine change to ease your symptoms. Follow-up care is a key part of your treatment and safety.  Be sure to make and go to all appointments, and call your doctor if you are having problems. It's also a good idea to know your test results and keep a list of the medicines you take. How can you care for yourself at home? · Drink plenty of fluids, enough so that your urine is light yellow or clear like water. If you have kidney, heart, or liver disease and have to limit fluids, talk with your doctor before you increase the amount of fluids you drink. · Include high-fiber foods in your diet each day. These include fruits, vegetables, beans, and whole grains. · Get at least 30 minutes of exercise on most days of the week. Walking is a good choice. You also may want to do other activities, such as running, swimming, cycling, or playing tennis or team sports. · Take a fiber supplement, such as Citrucel or Metamucil, every day. Read and follow all instructions on the label. · Schedule time each day for a bowel movement. A daily routine may help. Take your time having your bowel movement. · Support your feet with a small step stool when you sit on the toilet. This helps flex your hips and places your pelvis in a squatting position. · Your doctor may recommend an over-the-counter laxative to relieve your constipation. Examples are Milk of Magnesia and MiraLax. Read and follow all instructions on the label. Do not use laxatives on a long-term basis. When should you call for help? Call your doctor now or seek immediate medical care if:    · You have new or worse belly pain.     · You have new or worse nausea or vomiting.     · You have blood in your stools.    Watch closely for changes in your health, and be sure to contact your doctor if:    · Your constipation is getting worse.     · You do not get better as expected. Where can you learn more? Go to http://nataly-rafael.info/. Enter 21 [id number] in the search box to learn more about \"Constipation: Care Instructions. \"  Current as of: September 23, 2018  Content Version: 11.9  © 2006-2018 [employer]. Care instructions adapted under license by [employer] (which disclaims liability or warranty for this information). If you have questions about a medical condition or this instruction, always ask your healthcare professional. [address] any warranty or liability for your use of this information. DISCHARGE SUMMARY from Nurse    PATIENT INSTRUCTIONS:    After general anesthesia or intravenous sedation, for 24 hours or while taking prescription Narcotics:  · Limit your activities  · Do not drive and operate hazardous machinery  · Do not make important personal or business decisions  · Do  not drink alcoholic beverages  · If you have not urinated within 8 hours after discharge, please contact your surgeon on call. Report the following to your surgeon:  · Excessive pain, swelling, redness or odor of or around the surgical area  · Temperature over 100.5  · Nausea and vomiting lasting longer than 4 hours or if unable to take medications  · Any signs of decreased circulation or nerve impairment to extremity: change in color, persistent  numbness, tingling, coldness or increase pain  · Any questions      *  Please give a list of your current medications to your Primary Care Provider. *  Please update this list whenever your medications are discontinued, doses are      changed, or new medications (including over-the-counter products) are added. *  Please carry medication information at all times in case of emergency situations. These are general instructions for a healthy lifestyle:    No smoking/ No tobacco products/ Avoid exposure to second hand smoke  Surgeon General's Warning:  Quitting smoking now greatly reduces serious risk to your health.     Obesity, smoking, and sedentary lifestyle greatly increases your risk for illness    A healthy diet, regular physical exercise & weight monitoring are important for maintaining a healthy lifestyle    You may be retaining fluid if you have a history of heart failure or if you experience any of the following symptoms:  Weight gain of 3 pounds or more overnight or 5 pounds in a week, increased swelling in our hands or feet or shortness of breath while lying flat in bed. Please call your doctor as soon as you notice any of these symptoms; do not wait until your next office visit. Recognize signs and symptoms of STROKE:    F-face looks uneven    A-arms unable to move or move unevenly    S-speech slurred or non-existent    T-time-call 911 as soon as signs and symptoms begin-DO NOT go       Back to bed or wait to see if you get better-TIME IS BRAIN. Warning Signs of HEART ATTACK     Call 911 if you have these symptoms:   Chest discomfort. Most heart attacks involve discomfort in the center of the chest that lasts more than a few minutes, or that goes away and comes back. It can feel like uncomfortable pressure, squeezing, fullness, or pain.  Discomfort in other areas of the upper body. Symptoms can include pain or discomfort in one or both arms, the back, neck, jaw, or stomach.  Shortness of breath with or without chest discomfort.  Other signs may include breaking out in a cold sweat, nausea, or lightheadedness. Don't wait more than five minutes to call 911 - MINUTES MATTER! Fast action can save your life. Calling 911 is almost always the fastest way to get lifesaving treatment. Emergency Medical Services staff can begin treatment when they arrive -- up to an hour sooner than if someone gets to the hospital by car. The discharge information has been reviewed with the patient/family. The patient/family verbalized understanding.   Discharge medications reviewed with the patient/family and appropriate educational materials and side effects teaching were provided.   ___________________________________________________________________________________________________________________________________

## 2019-02-16 NOTE — OP NOTES
[address] [place] [address]   OPERATIVE REPORT    Name:  Amarilis Moreno  MR#:   [id number]  :  1955  ACCOUNT #:  [de-identified]  DATE OF SERVICE:  02/15/2019    PREOPERATIVE DIAGNOSIS:  Elevated PSA with normal prostate by KAREN. POSTOPERATIVE DIAGNOSIS:  Elevated PSA with normal prostate by KAREN. PROCEDURE PERFORMED:  Transperineal saturation prostate biopsy. SURGEON:  Rene Bal MD.    ASSISTANT:  None. ANESTHESIA:  General by GET. COMPLICATIONS:  None. SPECIMENS REMOVED:  24 cores of tissue from the prostate, 12 from each side sampling  the apex x4, mid portion x4, and base x4 on each side. IMPLANTS: none    ESTIMATED BLOOD LOSS:  Minimal.    TUBES AND DRAINS:  A 16-Kenyan Pike catheter to leg bag. INDICATIONS FOR OPERATION:  A 78-year-old gentleman who was found to a serum PSA  level of 8.6 on routine testing. He has no history of urinary tract symptoms and no  family history of prostate malignancy. An attempt was made to biopsy the prostate in  the office, which failed because of the patient's intolerance of needle stick  discomfort. He is now admitted for prostate biopsy under anesthesia. PROCEDURE:  After establishing adequate general anesthesia by GET, the patient was  placed in dorsal lithotomy position with the legs suspended in Jordan stirrups,  cushioned with soft femoral padding. The external genitalia and perineum were then  prepped with antibiotic scrub solution and draped in a sterile manner. A 16-Kenyan  Pike catheter was passed into the bladder. Balloon inflated to 5 mL of volume with  saline solution left in place to serve as a marker for the location of the prostatic  urethra. Transrectal Ultrasound    A transrectal ultrasound probe was then put into the rectum and  measurements of the prostate were obtained, 5.39 cm x 2.52 cm x 4.31 cm. The  calculated prostate volume is 30.7 mL. The PSA density is 0.28.   Ultrasound imaging  of the prostate in the transverse and sagittal planes revealed mottled, scattered,  calcific densities posteriorly in both lobes but no distinct prostate stone densities  are evident. There are no cysts. There is no hypodense segment on either side. Transperineal Prostate Biopsy    A14-gauge needle was then introduced into midline of the perineum at the anal verge,  directed towards the prostate. This served as a conduit for subsequent passage of a  16-gauge spring loaded biopsy needle which was used to obtain the 12 cores of tissue  from the left lobe sampling the apex x4, the mid portion x4, and the base x4. The  right lobe was then sampled in the same fashion with multiple passages of the  16-gauge needle through the 14-gauge conduit needle sampling the apex x4, the mid  portion x4, and the base x4. Specimens were submitted in separate containers labeled  right and left lobe, apex, mid, and posterior prostate. The Pike catheter was then  flushed with saline solution finding no signs of bleeding within the bladder. The  Pike catheter was placed to leg bag gravity drainage. The conduit needle was  removed from the perineum, and pressure was applied for 1 minute. The patient was  then brought from the operating room to the PACU in good condition.       Alicia Werner MD      [id number]  D:  02/15/2019 12:02  T:  02/16/2019 0:34  JOB #:  [id number]

## 2019-02-18 ENCOUNTER — OFFICE VISIT (OUTPATIENT)
Dept: UROLOGY | Age: 64
End: 2019-02-18

## 2019-02-18 VITALS
BODY MASS INDEX: 31.78 KG/M2 | WEIGHT: 227 LBS | OXYGEN SATURATION: 98 % | DIASTOLIC BLOOD PRESSURE: 72 MMHG | HEIGHT: 71 IN | SYSTOLIC BLOOD PRESSURE: 120 MMHG | HEART RATE: 71 BPM

## 2019-02-18 DIAGNOSIS — R97.20 ELEVATED PSA, LESS THAN 10 NG/ML: Primary | ICD-10-CM

## 2019-02-18 NOTE — PROGRESS NOTES
Delia Denis 59 y.o. male     Mr. Dennis Luque seen today for prostate biopsy pathology report    Mr. Brannon seen today for prostate biopsy assessing elevated PSA     Prostate biopsy attempt in the office unsuccessful because of intolerance for needle sticks in the perineum and prostate region  Prostate biopsy will require general anesthesia  elevated PSA found on routine testing-PSA 8.6        Patient has no irritative or obstructive voiding symptoms  No family history of prostate malignancy        PSA 8.6 on 27 September 2018 24 core transperineal prostate biopsy on 15 February 2019 prostate volume 30.7 cc PSA density 0.28  benign histology/chronic inflammation     Hospitalized at [facility] in [place] for sick sinus syndrome-pacemaker implanted/on no anticoagulation therapy        Review of Systems:   CNS: No seizures syncope headaches or visual changes  Respiratory: No wheezing shortness of breath chest pain or coughing  Cardiovascular: Sick sinus syndrome/pacemaker  Intestinal: No dyspepsia diarrhea or constipation  Urinary: Urinary urgency and frequency  Skeletal: Large joint arthritis  Endocrine no diabetes or thyroid disease  Other:        Allergies: No Known Allergies   Medications:    Current Outpatient Medications   Medication Sig Dispense Refill    trimethoprim-sulfamethoxazole (BACTRIM DS, SEPTRA DS) 160-800 mg per tablet Take 1 Tab by mouth two (2) times a day for 5 days. 10 Tab 0    lisinopril (PRINIVIL, ZESTRIL) 40 mg tablet Take 40 mg by mouth daily.  metoprolol succinate (TOPROL-XL) 50 mg XL tablet Take  by mouth daily.  furosemide (LASIX) 20 mg tablet Take  by mouth daily.  amLODIPine (NORVASC) 10 mg tablet Take  by mouth daily.  aspirin delayed-release 81 mg tablet Take  by mouth daily.  atorvastatin (LIPITOR) 20 mg tablet Take  by mouth daily.  docusate sodium (COLACE) 100 mg capsule Take 1 Cap by mouth two (2) times a day for 5 days.  10 Cap 2 Past Medical History:   Diagnosis Date    Acute kidney injury ([facility] [address]     Back problem     CAD (coronary artery disease)     Chest pain     Dizzinesses     GERD (gastroesophageal reflux disease)     Heart abnormality     Hypercholesterolemia     Hypertension     Joint swelling     Muscle ache     Muscle pain     Sleep apnea     untreated    Sleep trouble     Stiff joint     Swallowing difficulty       Past Surgical History:   Procedure Laterality Date    ABDOMEN SURGERY PROC UNLISTED      HX ORTHOPAEDIC      ? collar bone    HX ORTHOPAEDIC      right knee    HX PACEMAKER  09/14/2018     Social History     Socioeconomic History    Marital status:      Spouse name: Not on file    Number of children: Not on file    Years of education: Not on file    Highest education level: Not on file   Social Needs    Financial resource strain: Not on file    Food insecurity - worry: Not on file    Food insecurity - inability: Not on file   [employer] needs - medical: Not on file   Vysr needs - non-medical: Not on file   Occupational History    Not on file   Tobacco Use    Smoking status: Current Every Day Smoker     Packs/day: 0.25    Smokeless tobacco: Never Used   Substance and Sexual Activity    Alcohol use: Yes     Comment: occasionally    Drug use: No    Sexual activity: Not Currently   Other Topics Concern    Not on file   Social History Narrative    Not on file      History reviewed. No pertinent family history. Physical Examination: Well-nourished mature male in no apparent distress    Prostate biopsy pathology report MS  15 February 2019     A: PROSTATE, RIGHT APEX, CORE NEEDLE BIOPSY-   BENIGN PROSTATIC GLANDS AND STROMA WITH FOCAL CHRONIC INFLAMMATION,   NO TUMOR SEEN.    B: PROSTATE, RIGHT MID, CORE NEEDLE BIOPSY-   BENIGN PROSTATIC GLANDS AND STROMA WITH FOCAL CHRONIC INFLAMMATION,   NO TUMOR SEEN.   C: PROSTATE, RIGHT BASE, CORE NEEDLE BIOPSY- BENIGN PROSTATIC GLANDS AND STROMA WITH FOCAL CHRONIC INFLAMMATION,   NO TUMOR SEEN.   D: PROSTATE, LEFT APEX, CORE NEEDLE BIOPSY-   BENIGN PROSTATIC GLANDS AND STROMA, NO TUMOR SEEN.   E: PROSTATE, LEFT MID, CORE NEEDLE BIOPSY-   BENIGN PROSTATIC GLANDS AND STROMA, NO TUMOR SEEN. F: PROSTATE, LEFT BASE, CORE NEEDLE BIOPSY-   BENIGN PROSTATIC GLANDS AND STROMA WITH FOCAL CHRONIC INFLAMMATION,   NO TUMOR SEEN. Impression: Elevated PSA with -24 core prostate biopsy        Plan: Pathology report described and discussed with patient today    Chronic inflammation may account for PSA elevation in this case but PSA density level is worrisome for the presence of prostate malignancy-discussed with patient today    RTC 4 months repeat PSA testing      More than 1/2 of this 15 minute visit was spent in counselling and coordination of care, as described above. Cyndi Hart MD  -electronically signed-    PLEASE NOTE:  This document has been produced using voice recognition software. Unrecognized errors in transcription may be present.

## 2019-02-18 NOTE — PATIENT INSTRUCTIONS
Prostate-Specific Antigen (PSA) Test: About This Test  What is it? A prostate-specific antigen (PSA) test measures the amount of PSA in your blood. PSA is released by a man's prostate gland into his blood. A high PSA level may mean that you have an enlargement, infection, or cancer of the prostate. Why is this test done? You may have this test to:  · Check for prostate cancer. · Watch prostate cancer and see if treatment is working. How can you prepare for the test?  Do not ejaculate during the 2 days before your PSA blood test, either during sex or masturbation. What happens before the test?  Tell your doctor if you have had a:  · Test to look at your bladder (cystoscopy) in the past several weeks. · Prostate biopsy in the past several weeks. · Prostate infection or urinary tract infection that has not gone away. · Tube (catheter) inserted into your bladder to drain urine recently. What happens during the test?  A health professional takes a sample of your blood. What happens after the test?  You can go back to your usual activities right away. When should you call for help? Watch closely for changes in your health, and be sure to contact your doctor if you have any questions about this test.  Follow-up care is a key part of your treatment and safety. Be sure to make and go to all appointments, and call your doctor if you are having problems. It's also a good idea to keep a list of the medicines you take. Ask your doctor when you can expect to have your test results. Where can you learn more? Go to http://nataly-rafael.info/. Enter F849 in the search box to learn more about \"Prostate-Specific Antigen (PSA) Test: About This Test.\"  Current as of: March 27, 2018  Content Version: 11.9  © 5741-7129 [employer]. Care instructions adapted under license by [employer] (which disclaims liability or warranty for this information).  If you have questions about a medical condition or this instruction, always ask your healthcare professional. [place] [address] any warranty or liability for your use of this information.

## 2019-02-18 NOTE — PROGRESS NOTES
Mr. Zoe Severe has a reminder for a \"due or due soon\" health maintenance. I have asked that he contact his primary care provider for follow-up on this health maintenance.

## 2019-02-28 ENCOUNTER — DOCUMENTATION ONLY (OUTPATIENT)
Dept: UROLOGY | Age: 64
End: 2019-02-28

## 2019-07-09 ENCOUNTER — HOSPITAL ENCOUNTER (OUTPATIENT)
Dept: LAB | Age: 64
Discharge: HOME | End: 2019-07-09
Payer: COMMERCIAL

## 2019-07-09 ENCOUNTER — OFFICE VISIT (OUTPATIENT)
Dept: UROLOGY | Age: 64
End: 2019-07-09

## 2019-07-09 VITALS
WEIGHT: 217 LBS | SYSTOLIC BLOOD PRESSURE: 119 MMHG | OXYGEN SATURATION: 92 % | HEIGHT: 71 IN | BODY MASS INDEX: 30.38 KG/M2 | DIASTOLIC BLOOD PRESSURE: 79 MMHG | HEART RATE: 72 BPM

## 2019-07-09 DIAGNOSIS — R97.20 PSA ELEVATION: Primary | ICD-10-CM

## 2019-07-09 DIAGNOSIS — R97.20 PSA ELEVATION: ICD-10-CM

## 2019-07-09 DIAGNOSIS — N41.1 CHRONIC PROSTATITIS: ICD-10-CM

## 2019-07-09 LAB
BILIRUB UR QL STRIP: NEGATIVE
GLUCOSE UR-MCNC: NEGATIVE MG/DL
KETONES P FAST UR STRIP-MCNC: NEGATIVE MG/DL
PH UR STRIP: 5.5 [PH] (ref 4.6–8)
PROT UR QL STRIP: NEGATIVE
PSA SERPL-MCNC: 5.2 NG/ML (ref 0–4)
SP GR UR STRIP: 1.02 (ref 1–1.03)
UA UROBILINOGEN AMB POC: NORMAL (ref 0.2–1)
URINALYSIS CLARITY POC: CLEAR
URINALYSIS COLOR POC: YELLOW
URINE BLOOD POC: NORMAL
URINE LEUKOCYTES POC: NORMAL
URINE NITRITES POC: NEGATIVE

## 2019-07-09 PROCEDURE — 84153 ASSAY OF PSA TOTAL: CPT

## 2019-07-09 RX ORDER — INSULIN LISPRO 100 [IU]/ML
6 INJECTION, SOLUTION INTRAVENOUS; SUBCUTANEOUS
COMMUNITY
Start: 2019-05-09

## 2019-07-09 RX ORDER — PEN NEEDLE, DIABETIC 31 GX3/16"
NEEDLE, DISPOSABLE MISCELLANEOUS
COMMUNITY
Start: 2019-05-09

## 2019-07-09 RX ORDER — PEN NEEDLE, DIABETIC 31 GX5/16"
NEEDLE, DISPOSABLE MISCELLANEOUS
Refills: 11 | COMMUNITY
Start: 2019-06-18

## 2019-07-09 RX ORDER — BLOOD SUGAR DIAGNOSTIC
STRIP MISCELLANEOUS
Refills: 11 | COMMUNITY
Start: 2019-06-07

## 2019-07-09 RX ORDER — LANCETS 33 GAUGE
EACH MISCELLANEOUS
COMMUNITY
Start: 2019-05-10

## 2019-07-09 RX ORDER — INSULIN GLARGINE 100 [IU]/ML
35 INJECTION, SOLUTION SUBCUTANEOUS
COMMUNITY
Start: 2019-05-10

## 2019-07-09 RX ORDER — LANCETS
EACH MISCELLANEOUS
COMMUNITY
Start: 2019-05-09

## 2019-07-09 NOTE — PROGRESS NOTES
Mr. Brayden Jara has a reminder for a \"due or due soon\" health maintenance. I have asked that he contact his primary care provider for follow-up on this health maintenance.

## 2019-07-09 NOTE — PATIENT INSTRUCTIONS
Prostate Cancer Screening: Care Instructions Your Care Instructions The prostate gland is an organ found just below a man's bladder. It is the size and shape of a walnut. It surrounds the tube that carries urine from the bladder out of the body through the penis. This tube is called the urethra. Prostate cancer is the abnormal growth of cells in the prostate. It is the second most common type of cancer in men. (Skin cancer is the most common.) Most cases of prostate cancer occur in men older than 72. The disease runs in families. And it's more common in -American men. When it's found and treated early, prostate cancer may be cured. But it is not always treated. This is because prostate cancer may not shorten your life, especially if you are older and the cancer is growing slowly. Follow-up care is a key part of your treatment and safety. Be sure to make and go to all appointments, and call your doctor if you are having problems. It's also a good idea to know your test results and keep a list of the medicines you take. What are the screening tests for prostate cancer? The main screening test for prostate cancer is the prostate-specific antigen (PSA) test. This is a blood test that measures how much PSA is in your blood. A high level may mean that you have an enlargement, an infection, or cancer. Along with the PSA test, you may have a digital rectal exam. The digital (finger) rectal exam checks for anything abnormal in your prostate. To do the exam, the doctor puts a lubricated, gloved finger into your rectum. If these tests suggest cancer, you may need a prostate biopsy. How is prostate cancer diagnosed? In a biopsy, the doctor takes small tissue samples from your prostate gland. Another doctor then looks at the tissue under a microscope to see if there are cancer cells, signs of infection, or other problems. The results help diagnose prostate cancer. What are the pros and cons of screening? Neither a PSA test nor a digital rectal exam can tell you for sure that you do or do not have cancer. But they can help you decide if you need more tests, such as a prostate biopsy. Screening tests may be useful because most men with prostate cancer don't have symptoms. It can be hard to know if you have cancer until it is more advanced. And then it's harder to treat. But having a PSA test can also cause harm. The test may show high levels of PSA that aren't caused by cancer. So you could have a prostate biopsy you didn't need. Or the PSA test might be normal when there is cancer, so a cancer might not be found early. The test can also find cancers that would never have caused a problem during your lifetime. So you might have treatment that was not needed. Prostate cancer usually develops late in life and grows slowly. For many men, it does not shorten their lives. Some experts advise screening only for men who are at high risk. Talk with your doctor to see if screening is right for you. Where can you learn more? Go to http://nataly-rafael.info/. Enter R550 in the search box to learn more about \"Prostate Cancer Screening: Care Instructions. \" Current as of: March 27, 2018 Content Version: 11.9 © 8060-3428 [employer]. Care instructions adapted under license by [employer] (which disclaims liability or warranty for this information). If you have questions about a medical condition or this instruction, always ask your healthcare professional. [place] [address] any warranty or liability for your use of this information.

## 2019-07-10 NOTE — PROGRESS NOTES
Yuly Stout 59 y.o. male     Mr. Rosa Watts seen today for elevated PSA surveillance-patient has a history of elevated PSA with negative prostate biopsy x1-here today for PSA testing and reassessment      Prostate biopsy attempt in the office unsuccessful because of intolerance for needle sticks in the perineum and prostate region  Prostate biopsy will require general anesthesia  elevated PSA found on routine testing-PSA 8.6     Patient has no irritative or obstructive voiding symptoms  No family history of prostate malignancy        PSA 8.6 on 27 September 2018   24 core  transperineal prostate biopsy under general anesthesia on 15 February 2019 volume 30.7 cc PSA density 0.28 benign histology/chronic inflammation     Hospitalized at [facility] in [place] for sick sinus syndrome-pacemaker implanted/on no anticoagulation therapy        Review of Systems:   CNS: No seizures syncope headaches or visual changes  Respiratory: No wheezing shortness of breath chest pain or coughing  Cardiovascular: Sick sinus syndrome/pacemaker  Intestinal: No dyspepsia diarrhea or constipation  Urinary: Urinary urgency and frequency  Skeletal: Large joint arthritis  Endocrine no diabetes or thyroid disease  Other:     Allergies: No Known Allergies   Medications:    Current Outpatient Medications   Medication Sig Dispense Refill    insulin glargine (LANTUS SOLOSTAR U-100 INSULIN) 100 unit/mL (3 mL) inpn 35 Units by SubCUTAneous route.  insulin lispro (HUMALOG KWIKPEN INSULIN) 100 unit/mL kwikpen 6 Units by SubCUTAneous route.       glucose blood VI test strips (BLOOD GLUCOSE TEST) strip Please specify brand:One Touch Verio Test Strips   ICD 10 code : E 11.9 Type 2 Diabetes  Is patient on insulin: Yes Z279.4 Long Term (current) Insulin Use      ONETOUCH VERIO strip CHECK GLUCOSE BEFORE MEALS FOR 30 DAYS  11    lancets misc Lancet Type: One Touch Delica Lancets (use with One Touch Ultra 2 or One Touch UltraMini blood glucose meters). 1 box of 100 strips  ICD 10 code : E 11.9 Type 2 Diabetes  Is patient on insulin: Yes Z279.4 Long Term (current) Insulin Use      BD ULTRA-FINE MINI PEN NEEDLE 31 gauge x 3/16\" ndle USE 4 TIMES DAILY SUBCUTANEOUSLY  11    ONE TOUCH DELICA 33 gauge misc       Insulin Needles, Disposable, 32 gauge x 5/32\" ndle 1 box of 100 each.  lisinopril (PRINIVIL, ZESTRIL) 40 mg tablet Take 40 mg by mouth daily.  metoprolol succinate (TOPROL-XL) 50 mg XL tablet Take  by mouth daily.  furosemide (LASIX) 20 mg tablet Take  by mouth daily.  amLODIPine (NORVASC) 10 mg tablet Take  by mouth daily.  aspirin delayed-release 81 mg tablet Take  by mouth daily.  atorvastatin (LIPITOR) 20 mg tablet Take  by mouth daily. Past Medical History:   Diagnosis Date    Acute kidney injury ([facility] [address]     Back problem     CAD (coronary artery disease)     Chest pain     Dizzinesses     GERD (gastroesophageal reflux disease)     Heart abnormality     Hypercholesterolemia     Hypertension     Joint swelling     Muscle ache     Muscle pain     Sleep apnea     untreated    Sleep trouble     Stiff joint     Swallowing difficulty       Past Surgical History:   Procedure Laterality Date    ABDOMEN SURGERY PROC UNLISTED      HX ORTHOPAEDIC      ? collar bone    HX ORTHOPAEDIC      right knee    HX PACEMAKER  09/14/2018     Social History     Socioeconomic History    Marital status:      Spouse name: Not on file    Number of children: Not on file    Years of education: Not on file    Highest education level: Not on file   Occupational History    Not on file   Social Needs    Financial resource strain: Not on file    Food insecurity:     Worry: Not on file     Inability: Not on file    Transportation needs:     Medical: Not on file     Non-medical: Not on file   Tobacco Use    Smoking status: Current Every Day Smoker     Packs/day: 0.25    Smokeless tobacco: Never Used   Substance and Sexual Activity    Alcohol use: Yes     Comment: occasionally    Drug use: No    Sexual activity: Not Currently   Lifestyle    Physical activity:     Days per week: Not on file     Minutes per session: Not on file    Stress: Not on file   Relationships    Social connections:     Talks on phone: Not on file     Gets together: Not on file     Attends [employer] service: Not on file     Active member of club or organization: Not on file     Attends meetings of clubs or organizations: Not on file     Relationship status: Not on file    Intimate partner violence:     Fear of current or ex partner: Not on file     Emotionally abused: Not on file     Physically abused: Not on file     Forced sexual activity: Not on file   Other Topics Concern    Not on file   Social History Narrative    Not on file      History reviewed. No pertinent family history. Physical Examination: Well-nourished mature male in no apparent distress      Prostate by KAREN is normal size shape and consistency       Negative dipstick/nitrite negative/heme-negative urinalysis:     Impression: Elevated PSA with negative prostate biopsy x1    Plan: PSA today    Described discussed prospect of repeat prostate biopsy for persistent PSA elevation    RTC 6 months PSA testing      More than 1/2 of this 15 minute visit was spent in counselling and coordination of care, as described above. Canelo Erickson MD  -electronically signed-    PLEASE NOTE:  This document has been produced using voice recognition software. Unrecognized errors in transcription may be present.

## 2019-07-10 NOTE — PROGRESS NOTES
PSA 5.2 on 8 July 2019           PSA 8.6 on 27 September 2018   24 core  transperineal prostate biopsy under general anesthesia on 15 February 2019 volume 30.7 cc PSA density 0.28 benign histology/chronic inflammation       Impression elevated but declining PSA status post negative prostate biopsy x1    Plan: Repeat PSA testing in 6 months    Garfield Lafleur MD

## 2025-07-30 ENCOUNTER — OFFICE VISIT (OUTPATIENT)
Facility: CLINIC | Age: 70
End: 2025-07-30
Payer: MEDICARE

## 2025-07-30 ENCOUNTER — HOSPITAL ENCOUNTER (OUTPATIENT)
Age: 70
Setting detail: SPECIMEN
Discharge: HOME | End: 2025-08-02

## 2025-07-30 VITALS
OXYGEN SATURATION: 97 % | BODY MASS INDEX: 24.22 KG/M2 | SYSTOLIC BLOOD PRESSURE: 154 MMHG | HEIGHT: 71 IN | WEIGHT: 173 LBS | HEART RATE: 95 BPM | DIASTOLIC BLOOD PRESSURE: 92 MMHG | TEMPERATURE: 97.6 F | RESPIRATION RATE: 16 BRPM

## 2025-07-30 DIAGNOSIS — Z91.199 PERSONAL HISTORY OF NONCOMPLIANCE WITH MEDICAL TREATMENT: ICD-10-CM

## 2025-07-30 DIAGNOSIS — Z12.5 SCREENING FOR PROSTATE CANCER: ICD-10-CM

## 2025-07-30 DIAGNOSIS — Z95.0 PACEMAKER: ICD-10-CM

## 2025-07-30 DIAGNOSIS — I25.2 OLD MI (MYOCARDIAL INFARCTION): ICD-10-CM

## 2025-07-30 DIAGNOSIS — F14.90 COCAINE USE: ICD-10-CM

## 2025-07-30 DIAGNOSIS — I25.10 CORONARY ARTERY DISEASE INVOLVING NATIVE HEART, UNSPECIFIED VESSEL OR LESION TYPE, UNSPECIFIED WHETHER ANGINA PRESENT: ICD-10-CM

## 2025-07-30 DIAGNOSIS — I10 ESSENTIAL HYPERTENSION: ICD-10-CM

## 2025-07-30 DIAGNOSIS — Z12.11 COLON CANCER SCREENING: ICD-10-CM

## 2025-07-30 DIAGNOSIS — Z00.00 GENERAL MEDICAL EXAM: Primary | ICD-10-CM

## 2025-07-30 LAB — LABCORP DRAW FEE: NORMAL

## 2025-07-30 PROCEDURE — 99001 SPECIMEN HANDLING PT-LAB: CPT

## 2025-07-30 PROCEDURE — 1159F MED LIST DOCD IN RCRD: CPT | Performed by: FAMILY MEDICINE

## 2025-07-30 PROCEDURE — 99387 INIT PM E/M NEW PAT 65+ YRS: CPT | Performed by: FAMILY MEDICINE

## 2025-07-30 PROCEDURE — 3077F SYST BP >= 140 MM HG: CPT | Performed by: FAMILY MEDICINE

## 2025-07-30 PROCEDURE — 3080F DIAST BP >= 90 MM HG: CPT | Performed by: FAMILY MEDICINE

## 2025-07-30 SDOH — ECONOMIC STABILITY: FOOD INSECURITY
WITHIN THE PAST 12 MONTHS, YOU WORRIED THAT YOUR FOOD WOULD RUN OUT BEFORE YOU GOT MONEY TO BUY MORE.: PATIENT UNABLE TO ANSWER

## 2025-07-30 SDOH — ECONOMIC STABILITY: FOOD INSECURITY
WITHIN THE PAST 12 MONTHS, THE FOOD YOU BOUGHT JUST DIDN'T LAST AND YOU DIDN'T HAVE MONEY TO GET MORE.: PATIENT UNABLE TO ANSWER

## 2025-07-30 ASSESSMENT — PATIENT HEALTH QUESTIONNAIRE - PHQ9
2. FEELING DOWN, DEPRESSED OR HOPELESS: NOT AT ALL
1. LITTLE INTEREST OR PLEASURE IN DOING THINGS: NOT AT ALL
SUM OF ALL RESPONSES TO PHQ QUESTIONS 1-9: 0

## 2025-07-30 ASSESSMENT — ENCOUNTER SYMPTOMS
CONSTIPATION: 0
WHEEZING: 0
DIARRHEA: 0
SHORTNESS OF BREATH: 0
ABDOMINAL PAIN: 0
VOMITING: 0
EYES NEGATIVE: 1
BLOOD IN STOOL: 0
NAUSEA: 0
CHEST TIGHTNESS: 0

## 2025-07-30 NOTE — PROGRESS NOTES
Padmini Hawley is a 69 y.o. male is seen on 7/30/2025 for New Patient (Patient is here to establish care with provider.)      Assessment & Plan  General medical exam  Medications reconciled and documented  No records available for review  Need to obtain records from [facility] for PSA, Lipid, and Colon Cancer screening   Labs per orders  Orders:    PSA Screening; Future    TSH; Future    Hemoglobin A1C; Future    Lipid Panel; Future    Comprehensive Metabolic Panel; Future    CBC with Auto Differential; Future    Essential hypertension   Chronic, not at goal (unstable), DASH diet. No meds currently. Monitor and record home readings      Old MI (myocardial infarction)   Reported by patient. No records available for review. Poor historian         Pacemaker   Chronic- Has not had pacemaker check in years. Daughter will reschedule with Cardiology          Coronary artery disease involving native heart, unspecified vessel or lesion type, unspecified whether angina present   Chronic- unknown status      Screening for prostate cancer  Labs per order  Orders:    PSA Screening; Future    Colon cancer screening   Declined colonoscopy. Cologuard per order    Orders:    FIT-DNA (Cologuard)    Personal history of noncompliance with medical treatment   Discussed risks of non-compliance   Cocaine use   Patient reports uses socially- discussed risks     Further workup and/or meds pending lab result  Tylenol prn pain/fever  ER cp, sob, syncope, severe pain  Return in about 1 year (around 7/30/2026), or if symptoms worsen or fail to improve.           Subjective:     HPI  Presents today new to the office to establish care and for wellness visit. Has not seen a doctor since he had a heart attack about 8 years ago \"he thinks\". Does remember he was at [facility]. Has a pacemaker and missed recent appointment to get it checked. He is a poor historian and does not know who is cardiologist is and has not had the pacemaker checked

## 2025-07-30 NOTE — ASSESSMENT & PLAN NOTE
Chronic, not at goal (unstable), DASH diet. No meds currently. Monitor and record home readings

## 2025-07-30 NOTE — PROGRESS NOTES
Padmini Hawley presents today for   Chief Complaint   Patient presents with    New Patient     Patient is here to establish care with provider.       Is someone accompanying this pt? no    Is the patient using any DME equipment during OV? no    Health Maintenance Due   Topic Date Due    Depression Screen  Never done    Hepatitis C screen  Never done    DTaP/Tdap/Td vaccine (1 - Tdap) Never done    Lipids  Never done    Colorectal Cancer Screen  Never done    Shingles vaccine (1 of 2) Never done    Pneumococcal 50+ years Vaccine (1 of 1 - PCV) Never done    COVID-19 Vaccine (1 - 2024-25 season) Never done         \"Have you been to the ER, urgent care clinic since your last visit?  Hospitalized since your last visit?\"    NO    “Have you seen or consulted any other health care providers outside our system since your last visit?”    NO      “Have you had a colorectal cancer screening such as a colonoscopy/FIT/Cologuard?    NO    No colonoscopy on file  No cologuard on file  No FIT/FOBT on file   No flexible sigmoidoscopy on file

## 2025-07-30 NOTE — ASSESSMENT & PLAN NOTE
Patient reports uses socially- discussed risks     Further workup and/or meds pending lab result  Tylenol prn pain/fever  ER cp, sob, syncope, severe pain  Return in about 1 year (around 7/30/2026), or if symptoms worsen or fail to improve.

## 2025-08-04 LAB
ALBUMIN SERPL-MCNC: 4.3 G/DL (ref 3.9–4.9)
ALP SERPL-CCNC: 127 IU/L (ref 44–121)
ALT SERPL-CCNC: 19 IU/L (ref 0–44)
AST SERPL-CCNC: 16 IU/L (ref 0–40)
BASOPHILS # BLD AUTO: 0 X10E3/UL (ref 0–0.2)
BASOPHILS NFR BLD AUTO: 0 %
BILIRUB SERPL-MCNC: 0.5 MG/DL (ref 0–1.2)
BUN SERPL-MCNC: 22 MG/DL (ref 8–27)
BUN/CREAT SERPL: 13 (ref 10–24)
CALCIUM SERPL-MCNC: 9.3 MG/DL (ref 8.6–10.2)
CHLORIDE SERPL-SCNC: 104 MMOL/L (ref 96–106)
CHOLEST SERPL-MCNC: 181 MG/DL (ref 100–199)
CO2 SERPL-SCNC: 20 MMOL/L (ref 20–29)
CREAT SERPL-MCNC: 1.74 MG/DL (ref 0.76–1.27)
EGFRCR SERPLBLD CKD-EPI 2021: 42 ML/MIN/1.73
EOSINOPHIL # BLD AUTO: 0.1 X10E3/UL (ref 0–0.4)
EOSINOPHIL NFR BLD AUTO: 2 %
ERYTHROCYTE [DISTWIDTH] IN BLOOD BY AUTOMATED COUNT: 13.8 % (ref 11.6–15.4)
GLOBULIN SER CALC-MCNC: 2.7 G/DL (ref 1.5–4.5)
GLUCOSE SERPL-MCNC: 101 MG/DL (ref 70–99)
HBA1C MFR BLD: 5.5 % (ref 4.8–5.6)
HCT VFR BLD AUTO: 39.8 % (ref 37.5–51)
HDLC SERPL-MCNC: 71 MG/DL
HGB BLD-MCNC: 13 G/DL (ref 13–17.7)
IMM GRANULOCYTES # BLD AUTO: 0 X10E3/UL (ref 0–0.1)
IMM GRANULOCYTES NFR BLD AUTO: 0 %
LDLC SERPL CALC-MCNC: 99 MG/DL (ref 0–99)
LYMPHOCYTES # BLD AUTO: 0.9 X10E3/UL (ref 0.7–3.1)
LYMPHOCYTES NFR BLD AUTO: 14 %
MCH RBC QN AUTO: 27 PG (ref 26.6–33)
MCHC RBC AUTO-ENTMCNC: 32.7 G/DL (ref 31.5–35.7)
MCV RBC AUTO: 83 FL (ref 79–97)
MONOCYTES # BLD AUTO: 0.4 X10E3/UL (ref 0.1–0.9)
MONOCYTES NFR BLD AUTO: 6 %
NEUTROPHILS # BLD AUTO: 5.3 X10E3/UL (ref 1.4–7)
NEUTROPHILS NFR BLD AUTO: 78 %
PLATELET # BLD AUTO: 155 X10E3/UL (ref 150–450)
POTASSIUM SERPL-SCNC: 3.9 MMOL/L (ref 3.5–5.2)
PROT SERPL-MCNC: 7 G/DL (ref 6–8.5)
PSA SERPL-MCNC: 13.3 NG/ML (ref 0–4)
RBC # BLD AUTO: 4.82 X10E6/UL (ref 4.14–5.8)
SODIUM SERPL-SCNC: 143 MMOL/L (ref 134–144)
SPECIMEN STATUS REPORT: NORMAL
TRIGL SERPL-MCNC: 56 MG/DL (ref 0–149)
TSH SERPL DL<=0.005 MIU/L-ACNC: 1.28 UIU/ML (ref 0.45–4.5)
VLDLC SERPL CALC-MCNC: 11 MG/DL (ref 5–40)
WBC # BLD AUTO: 6.8 X10E3/UL (ref 3.4–10.8)

## 2025-08-07 ENCOUNTER — RESULTS FOLLOW-UP (OUTPATIENT)
Facility: CLINIC | Age: 70
End: 2025-08-07

## 2025-08-07 DIAGNOSIS — R97.20 ELEVATED PSA: Primary | ICD-10-CM

## (undated) DEVICE — GOWN,PREVENTION PLUS,XLN/XL,ST,24/CS: Brand: MEDLINE

## (undated) DEVICE — CATHETER F BLLN 5CC 16FR 2 W HYDRGEL COAT LESS TRAUM LUB

## (undated) DEVICE — SKIN MARKER,REGULAR TIP WITH RULER AND LABELS: Brand: DEVON

## (undated) DEVICE — KIT CLN UP BON SECOURS MARYV

## (undated) DEVICE — STANDARD HYPODERMIC NEEDLE,ALUMINUM HUB: Brand: MONOJECT

## (undated) DEVICE — COVER PRB W1XL11.8IN ENDOCAVITY CLR E BND NEOGUARD

## (undated) DEVICE — GAUZE SPONGES,16 PLY: Brand: CURITY

## (undated) DEVICE — MAX-CORE® DISPOSABLE CORE BIOPSY INSTRUMENT, 18G X 20CM: Brand: MAX-CORE

## (undated) DEVICE — DRAPE TWL SURG 16X26IN BLU ORB04] ALLCARE INC]

## (undated) DEVICE — STERILE POLYISOPRENE POWDER-FREE SURGICAL GLOVES: Brand: PROTEXIS

## (undated) DEVICE — 3M™ BAIR PAWS FLEX™ WARMING GOWN, STANDARD, 20 PER CASE 81003: Brand: BAIR PAWS™

## (undated) DEVICE — TABLE COVER: Brand: CONVERTORS

## (undated) DEVICE — SOLUTION IV 1000ML 0.9% SOD CHL

## (undated) DEVICE — URINARY LEG BAG COMBINATION PACK: Brand: HOLLISTER

## (undated) DEVICE — KENDALL SCD EXPRESS SLEEVES, KNEE LENGTH, MEDIUM: Brand: KENDALL SCD

## (undated) DEVICE — DEVICE STBL AD TRICOT ANCHR PD FOR 3 W F CATH STATLOK